# Patient Record
Sex: MALE | Race: WHITE | NOT HISPANIC OR LATINO | Employment: FULL TIME | URBAN - METROPOLITAN AREA
[De-identification: names, ages, dates, MRNs, and addresses within clinical notes are randomized per-mention and may not be internally consistent; named-entity substitution may affect disease eponyms.]

---

## 2017-12-19 ENCOUNTER — OFFICE VISIT (OUTPATIENT)
Dept: URGENT CARE | Facility: CLINIC | Age: 57
End: 2017-12-19
Payer: COMMERCIAL

## 2017-12-19 VITALS
WEIGHT: 265 LBS | HEART RATE: 92 BPM | DIASTOLIC BLOOD PRESSURE: 92 MMHG | SYSTOLIC BLOOD PRESSURE: 132 MMHG | HEIGHT: 70 IN | RESPIRATION RATE: 16 BRPM | OXYGEN SATURATION: 96 % | TEMPERATURE: 98 F | BODY MASS INDEX: 37.94 KG/M2

## 2017-12-19 DIAGNOSIS — M72.2 PLANTAR FASCIITIS OF LEFT FOOT: Primary | ICD-10-CM

## 2017-12-19 DIAGNOSIS — M79.672 ACUTE FOOT PAIN, LEFT: ICD-10-CM

## 2017-12-19 PROCEDURE — 99203 OFFICE O/P NEW LOW 30 MIN: CPT | Mod: S$GLB,,, | Performed by: PHYSICIAN ASSISTANT

## 2017-12-19 RX ORDER — LEVOTHYROXINE SODIUM 100 UG/1
100 TABLET ORAL DAILY
Refills: 11 | COMMUNITY
Start: 2017-11-30 | End: 2022-11-10

## 2017-12-19 RX ORDER — OLMESARTAN MEDOXOMIL AND HYDROCHLOROTHIAZIDE 40/12.5 40; 12.5 MG/1; MG/1
1 TABLET ORAL DAILY
Refills: 5 | COMMUNITY
Start: 2017-11-30

## 2017-12-19 RX ORDER — TESTOSTERONE 30 MG/1.5ML
SOLUTION TOPICAL
Refills: 5 | COMMUNITY
Start: 2017-11-06 | End: 2022-11-10

## 2017-12-19 NOTE — PATIENT INSTRUCTIONS
Treating Plantar Fasciitis  First, your healthcare provider tries to determine the cause of your problem in order to suggest ways to relieve pain. If your pain is due to poor foot mechanics, custom-made shoe inserts (orthoses) may help.    Reduce symptoms  · To relieve mild symptoms, try aspirin, ibuprofen, or other medicines as directed. Rubbing ice on the affected area may also help.  · To reduce severe pain and swelling, your healthcare provider may prescribe pills or injections or a walking cast in some instances. Physical therapy, such as ultrasound or a daily stretching program, may also be recommended. Surgery is rarely required.  · To reduce symptoms caused by poor foot mechanics, your foot may be taped. This supports the arch and temporarily controls movement. Night splints may also help by stretching the fascia.  Control movement  If taping helps, your healthcare provider may prescribe orthoses. Built from plaster casts of your feet, these inserts control the way your foot moves. As a result, your symptoms should go away.  Reduce overuse  Every time your foot strikes the ground, the plantar fascia is stretched. You can reduce the strain on the plantar fascia and the possibility of overuse by following these suggestions:  · Lose any excess weight.  · Avoid running on hard or uneven ground.  · Use orthoses at all times in your shoes and house slippers.  If surgery is needed  Your healthcare provider may consider surgery if other types of treatment don't control your pain. During surgery, the plantar fascia is partially cut to release tension. As you heal, fibrous tissue fills the space between the heel bone and the plantar fascia.   Date Last Reviewed: 10/14/2015  © 0189-0694 [employer] [address] [place] [address], [place] [address]. All rights reserved. This information is not intended as a substitute for professional medical care. Always follow your healthcare professional's  instructions.

## 2017-12-19 NOTE — PROGRESS NOTES
Subjective:       Patient ID: Eleuterio Marie Jr. is a 57 y.o. male.    Chief Complaint: Foot Injury (left)    Patient states the pain began about 2 weeks ago, pain to his left heel. fjd    PT PRESENTS WITH LEFT PLANTAR HEEL PAIN X 2 WEEKS. HE REPORTS WALKING AT WORK WHEN HE BEGAN TO FEEL PAIN. HE DENIES ANY TRAUMA. PAIN IS WORSE IN A.M. UPON GETTING OUT OF BED AND AFTER PROLONGED WALKING. MB      Foot Injury    Incident onset: 12/07/2017. The incident occurred at work. There was no injury mechanism. The pain is present in the left foot. The pain is at a severity of 7/10. The pain is moderate. The pain has been constant since onset. Associated symptoms include an inability to bear weight. Pertinent negatives include no numbness. The symptoms are aggravated by weight bearing. He has tried ice for the symptoms. The treatment provided no relief.     Review of Systems   Constitution: Negative for chills, fever and weakness.   HENT: Negative for congestion, ear pain and nosebleeds.    Eyes: Negative for blurred vision and pain.   Cardiovascular: Negative for chest pain and palpitations.   Respiratory: Negative for cough, shortness of breath and wheezing.    Skin: Negative for dry skin, itching and rash.   Musculoskeletal: Negative for arthritis, back pain, gout, joint swelling, muscle weakness, neck pain and stiffness.   Gastrointestinal: Negative for abdominal pain, constipation, diarrhea, nausea and vomiting.   Genitourinary: Negative for dysuria, frequency and hematuria.   Neurological: Negative for dizziness, headaches, numbness and seizures.   Allergic/Immunologic: Negative for hives.       Objective:      Physical Exam   Constitutional: He appears well-developed and well-nourished. He is active. No distress.   HENT:   Head: Normocephalic and atraumatic.   Right Ear: Hearing and external ear normal.   Left Ear: Hearing and external ear normal.   Nose: Nose normal. No nasal deformity. No epistaxis.    Mouth/Throat: Oropharynx is clear and moist and mucous membranes are normal.   Eyes: Conjunctivae and lids are normal. No scleral icterus.   Neck: Trachea normal and normal range of motion.   Cardiovascular: Intact distal pulses and normal pulses.    Pulmonary/Chest: Effort normal. No stridor. No respiratory distress.   Musculoskeletal:        Left foot: There is normal range of motion, no tenderness, no bony tenderness, no swelling, normal capillary refill and no deformity.   Neurological: He is alert. He has normal strength. He is not disoriented. No sensory deficit. GCS eye subscore is 4. GCS verbal subscore is 5. GCS motor subscore is 6.   Skin: Skin is warm, dry and intact. Capillary refill takes less than 2 seconds. He is not diaphoretic.   Psychiatric: He has a normal mood and affect. His speech is normal and behavior is normal. He is attentive.   Nursing note and vitals reviewed.      Assessment:       1. Plantar fasciitis of left foot    2. Acute foot pain, left        Plan:       Non-work related condition.      Patient Instructions:  (Ice, stretch, OTC NSAIDS. Follow up with your primary care provider as needed. )   Restrictions: Regular Duty, Discharged from Occupational Health  Return if symptoms worsen or fail to improve.

## 2017-12-19 NOTE — LETTER
[facility]  [address], [address]  [facility] [address]  Phone: [phone]  Fax: [phone]    Pt Name: Eleuterio Marie Jr.  Injury Date: 12/07/2017   Employee ID: [id number] Date of First Treatment: 12/19/2017   Company: [employer]            Appointment Time: 01:20 PM Arrived:  1:35 PM CST   Physician: Elijah Bryan PA-C Time Out: 15:10 PM           Office Treatment: Jack was seen today for foot injury.    Diagnoses and all orders for this visit:    Plantar fasciitis of left foot    Acute foot pain, left  -     X-Ray Foot Complete Left       Patient Instructions:  (Ice, stretch, OTC NSAIDS. Follow up with your primary care provider as needed. )    Restrictions: Regular Duty, Discharged from Occupational Health       Return Appointment: Discharged - Not Work related

## 2018-04-30 ENCOUNTER — CLINICAL SUPPORT (OUTPATIENT)
Dept: URGENT CARE | Facility: CLINIC | Age: 58
End: 2018-04-30

## 2018-04-30 DIAGNOSIS — Z02.83 ENCOUNTER FOR DRUG SCREENING: Primary | ICD-10-CM

## 2018-04-30 LAB
CTP QC/QA: YES
POC 10 PANEL DRUG SCREEN: NEGATIVE

## 2018-04-30 PROCEDURE — 80305 DRUG TEST PRSMV DIR OPT OBS: CPT | Mod: QW,S$GLB,, | Performed by: PREVENTIVE MEDICINE

## 2020-06-25 ENCOUNTER — OFFICE VISIT (OUTPATIENT)
Dept: URGENT CARE | Facility: CLINIC | Age: 60
End: 2020-06-25
Payer: COMMERCIAL

## 2020-06-25 VITALS
OXYGEN SATURATION: 97 % | DIASTOLIC BLOOD PRESSURE: 83 MMHG | WEIGHT: 265 LBS | SYSTOLIC BLOOD PRESSURE: 130 MMHG | HEART RATE: 92 BPM | HEIGHT: 70 IN | TEMPERATURE: 98 F | RESPIRATION RATE: 12 BRPM | BODY MASS INDEX: 37.94 KG/M2

## 2020-06-25 DIAGNOSIS — J02.9 PHARYNGITIS, UNSPECIFIED ETIOLOGY: Primary | ICD-10-CM

## 2020-06-25 DIAGNOSIS — Z03.818 ENCOUNTER FOR OBSERVATION FOR SUSPECTED EXPOSURE TO OTHER BIOLOGICAL AGENTS RULED OUT: ICD-10-CM

## 2020-06-25 PROCEDURE — U0003 INFECTIOUS AGENT DETECTION BY NUCLEIC ACID (DNA OR RNA); SEVERE ACUTE RESPIRATORY SYNDROME CORONAVIRUS 2 (SARS-COV-2) (CORONAVIRUS DISEASE [COVID-19]), AMPLIFIED PROBE TECHNIQUE, MAKING USE OF HIGH THROUGHPUT TECHNOLOGIES AS DESCRIBED BY CMS-2020-01-R: HCPCS

## 2020-06-25 PROCEDURE — 99202 PR OFFICE/OUTPT VISIT, NEW, LEVL II, 15-29 MIN: ICD-10-PCS | Mod: S$GLB,,, | Performed by: STUDENT IN AN ORGANIZED HEALTH CARE EDUCATION/TRAINING PROGRAM

## 2020-06-25 PROCEDURE — 99202 OFFICE O/P NEW SF 15 MIN: CPT | Mod: S$GLB,,, | Performed by: STUDENT IN AN ORGANIZED HEALTH CARE EDUCATION/TRAINING PROGRAM

## 2020-06-25 RX ORDER — OLMESARTAN MEDOXOMIL 20 MG/1
1 TABLET ORAL
COMMUNITY
End: 2022-08-25

## 2020-06-25 RX ORDER — CODEINE PHOSPHATE AND GUAIFENESIN 10; 100 MG/5ML; MG/5ML
10 SOLUTION ORAL
COMMUNITY
Start: 2015-09-22 | End: 2022-08-25

## 2020-06-25 RX ORDER — TADALAFIL 20 MG/1
TABLET ORAL
COMMUNITY
Start: 2020-03-19

## 2020-06-25 RX ORDER — INDOMETHACIN 75 MG/1
CAPSULE, EXTENDED RELEASE ORAL
COMMUNITY
Start: 2020-06-16 | End: 2022-11-10

## 2020-06-25 RX ORDER — LEVOTHYROXINE SODIUM 100 UG/1
1 TABLET ORAL
COMMUNITY

## 2020-06-25 RX ORDER — METHYLPREDNISOLONE 4 MG/1
TABLET ORAL
COMMUNITY
Start: 2020-05-26 | End: 2022-08-25

## 2020-06-25 RX ORDER — MONTELUKAST SODIUM 10 MG/1
TABLET ORAL
COMMUNITY
Start: 2020-06-12 | End: 2022-11-10

## 2020-06-25 RX ORDER — TESTOSTERONE 30 MG/1.5ML
SOLUTION TOPICAL
COMMUNITY
Start: 2020-03-19

## 2020-06-25 RX ORDER — SILDENAFIL CITRATE 20 MG/1
TABLET ORAL
COMMUNITY
Start: 2020-03-19

## 2020-06-25 NOTE — LETTER
[address] ? [place], [address] ? Phone [phone] ? Fax [phone]           Return to Work/School    Patient: Eleuterio Marie Jr.  YOB: 1960   Date: 06/25/2020      To Whom It May Concern:     Eleuterio Marie Jr. was in contact with/seen in my office on 06/25/2020. COVID-19 is present in our communities across the state. Not all patients are eligible or appropriate to be tested. In this situation, your employee meets the following criteria:     Eleuterio Marie Jr. has met the criteria for COVID-19 testing based upon symptoms, travel, and/or potential exposure. The test has been completed and is pending results at this time. During this time the employee is not able to work and should be quarantined per the Centers for Disease Control timelines.      If you have any questions or concerns, or if I can be of further assistance, please do not hesitate to contact me.     Sincerely,    Linwood Carter MD

## 2020-06-25 NOTE — PROGRESS NOTES
"Subjective:       Patient ID: Eleuterio Marie Jr. is a 60 y.o. male.    Vitals:  height is 5' 10" (1.778 m) and weight is 120.2 kg (265 lb). His tympanic temperature is 98.2 °F (36.8 °C). His blood pressure is 130/83 and his pulse is 92. His respiration is 12 and oxygen saturation is 97%.     Chief Complaint: Sore Throat and COVID-19 Concerns    Pt comes for COVID 19 swab testing; pt c/o sore throat x 2 days. Denied f/c, lower respiratory sx's, body aches. No known sick contacts.      Sore Throat   This is a new problem. The current episode started in the past 7 days. The problem has been gradually improving. Neither side of throat is experiencing more pain than the other. There has been no fever. The pain is at a severity of 2/10. The pain is mild. Pertinent negatives include no congestion, coughing, ear pain, shortness of breath, stridor or vomiting. He has had no exposure to strep or mono. He has tried NSAIDs for the symptoms. The treatment provided significant relief.       Constitution: Negative for chills, sweating, fatigue and fever.   HENT: Positive for sore throat. Negative for ear pain, congestion, sinus pain, sinus pressure and voice change.    Neck: Negative for painful lymph nodes.   Eyes: Negative for eye redness.   Respiratory: Negative for chest tightness, cough, sputum production, bloody sputum, COPD, shortness of breath, stridor, wheezing and asthma.    Gastrointestinal: Negative for nausea and vomiting.   Musculoskeletal: Negative for muscle ache.   Skin: Negative for rash.   Allergic/Immunologic: Negative for seasonal allergies and asthma.   Hematologic/Lymphatic: Negative for swollen lymph nodes.       Objective:      Physical Exam   Constitutional: He is oriented to person, place, and time. He appears well-developed. No distress.   HENT:   Head: Normocephalic and atraumatic.   Right Ear: External ear normal.   Left Ear: External ear normal.   Nose: Nose normal.   Mouth/Throat: Uvula is " midline, oropharynx is clear and moist and mucous membranes are normal. Tonsils are 1+ on the right. Tonsils are 1+ on the left. No tonsillar exudate.   Eyes: Conjunctivae and EOM are normal. Right eye exhibits no discharge. Left eye exhibits no discharge.   Cardiovascular: Normal rate, regular rhythm and normal heart sounds. Exam reveals no gallop and no friction rub.   No murmur heard.  Pulmonary/Chest: Effort normal and breath sounds normal. No stridor. No respiratory distress. He has no wheezes. He has no rales.   Neurological: He is alert and oriented to person, place, and time. No cranial nerve deficit (CN II-XII grossly intact).   Skin: Skin is warm, dry and no rash.   Psychiatric: His behavior is normal. Judgment and thought content normal.   Nursing note and vitals reviewed.        Assessment:       1. Pharyngitis, unspecified etiology    2. Encounter for observation for suspected exposure to other biological agents ruled out        Plan:         Pharyngitis, unspecified etiology  -     COVID-19 Routine Screening  - counseled on home care and OTC medications    Encounter for observation for suspected exposure to other biological agents ruled out  -     COVID-19 Routine Screening  - counseled patient on home isolation protocols pending test results, questions answered and return precautions given    Follow up if symptoms worsen or fail to improve.    Linwood Carter MD/MPH  [facility]  [facility]

## 2020-06-25 NOTE — PATIENT INSTRUCTIONS

## 2020-06-29 LAB — SARS-COV-2 RNA RESP QL NAA+PROBE: NOT DETECTED

## 2022-09-16 PROBLEM — R07.9 CHEST PAIN: Status: ACTIVE | Noted: 2022-09-16
